# Patient Record
(demographics unavailable — no encounter records)

---

## 2025-03-07 NOTE — IMAGING
[de-identified] : L V digit:  + edema and ecchymosis over PIP, + tenderness over middle phalanx, decreased ROM, NV intact.  X-ray L V digit 3 views:  buckle fracture of the base of the middle phalanx.

## 2025-03-07 NOTE — HISTORY OF PRESENT ILLNESS
[de-identified] : Aguila is a 13 year old male who presents to the walk in accompanied by his father for evaluation of left fifth digit pain status post injury that occurred earlier today.  He states he was playing football when he jammed the left pinky.  He states it was extremely swollen and bruised and he is unable to fully bend the digit.  He states it is painful to the touch

## 2025-03-07 NOTE — ASSESSMENT
[FreeTextEntry1] : 13 year old male with L V digit middle phalanx fracture.  I have placed him in an alumafoam splint and he will f/u in 3 weeks to see Dr. Andrade for reassessment.  He will use OTC medication as needed and if there is any issues patient's father will contact the office.

## 2025-03-30 NOTE — DATA REVIEWED
[Acceptable Fracture Allignment] : fracture alignment remains acceptable [de-identified] :  2 views of left fingers reviewed.

## 2025-03-30 NOTE — DATA REVIEWED
[Acceptable Fracture Allignment] : fracture alignment remains acceptable [de-identified] :  2 views of left fingers reviewed.

## 2025-03-30 NOTE — PHYSICAL EXAM
[Not Examined] : not examined [Normal] : The patient is moving all extremities spontaneously without any gross neurologic deficits. They walk with a fluid nonantalgic gait. There are equal and symmetric deep tendon reflexes in the upper and lower extremities bilaterally. There is gross intact sensation to soft and light touch in the bilateral upper and lower extremities [de-identified] :  ISLT Intact Motor

## 2025-03-30 NOTE — ASSESSMENT
[FreeTextEntry1] : Splint removed today. Fracture is healed. No gym/sports for two weeks. Work on making a fist and straightening out fingers. Follow up in 2 weeks with RUIZ Pham or Valerie for ROM check, can return to gym/sports as long as there are no issues.

## 2025-03-30 NOTE — PHYSICAL EXAM
[Not Examined] : not examined [Normal] : The patient is moving all extremities spontaneously without any gross neurologic deficits. They walk with a fluid nonantalgic gait. There are equal and symmetric deep tendon reflexes in the upper and lower extremities bilaterally. There is gross intact sensation to soft and light touch in the bilateral upper and lower extremities [de-identified] :  ISLT Intact Motor